# Patient Record
Sex: MALE | Race: WHITE
[De-identification: names, ages, dates, MRNs, and addresses within clinical notes are randomized per-mention and may not be internally consistent; named-entity substitution may affect disease eponyms.]

---

## 2021-12-30 ENCOUNTER — HOSPITAL ENCOUNTER (OUTPATIENT)
Dept: HOSPITAL 83 - US | Age: 56
Discharge: HOME | End: 2021-12-30
Attending: NURSE PRACTITIONER
Payer: OTHER GOVERNMENT

## 2021-12-30 DIAGNOSIS — R22.31: Primary | ICD-10-CM

## 2022-09-23 ENCOUNTER — INITIAL CONSULT (OUTPATIENT)
Dept: NEUROSURGERY | Age: 57
End: 2022-09-23
Payer: OTHER GOVERNMENT

## 2022-09-23 VITALS
SYSTOLIC BLOOD PRESSURE: 149 MMHG | WEIGHT: 240 LBS | DIASTOLIC BLOOD PRESSURE: 90 MMHG | BODY MASS INDEX: 33.6 KG/M2 | HEIGHT: 71 IN | HEART RATE: 88 BPM

## 2022-09-23 DIAGNOSIS — M54.2 NECK PAIN: Primary | ICD-10-CM

## 2022-09-23 PROCEDURE — 99202 OFFICE O/P NEW SF 15 MIN: CPT

## 2022-09-23 PROCEDURE — 99204 OFFICE O/P NEW MOD 45 MIN: CPT | Performed by: NEUROLOGICAL SURGERY

## 2022-09-23 RX ORDER — LEVOTHYROXINE SODIUM 137 UG/1
0.14 TABLET ORAL
COMMUNITY
Start: 2022-03-23

## 2022-09-23 RX ORDER — GABAPENTIN 300 MG/1
300 CAPSULE ORAL
COMMUNITY
Start: 2022-03-23

## 2022-09-23 RX ORDER — ATORVASTATIN CALCIUM 20 MG/1
20 TABLET, FILM COATED ORAL
COMMUNITY
Start: 2022-03-23 | End: 2022-09-23

## 2022-09-23 RX ORDER — LISINOPRIL 10 MG/1
10 TABLET ORAL
COMMUNITY
Start: 2022-03-23

## 2022-09-23 RX ORDER — MELOXICAM 15 MG/1
15 TABLET ORAL
COMMUNITY
Start: 2022-03-23

## 2022-09-23 ASSESSMENT — ENCOUNTER SYMPTOMS
ALLERGIC/IMMUNOLOGIC NEGATIVE: 1
RESPIRATORY NEGATIVE: 1
GASTROINTESTINAL NEGATIVE: 1
EYES NEGATIVE: 1

## 2022-09-23 NOTE — PROGRESS NOTES
Domingo Rodriguez (:  1965) is a 60 y.o. male,New patient, here for evaluation of the following chief complaint(s):  Consultation (Cervical pain radiates down left arm into hands with weakness, numbness and tingling. [email or web address] over a yr ago, No [facility])         ASSESSMENT/PLAN:  1. Neck pain  60year old male who presents with neck pain. He has early signs of myelopathy. His MRI shows moderate to severe stenosis at C3-C4 and C4-C5. I am recommending a C3-C4 and C4-C5 anterior cervical diskectomy and fusion. He will need to quit smoking    No follow-ups on file. Subjective   SUBJECTIVE/OBJECTIVE:  HPI  60year old male who presents with neck and left arm pain. The pain is described as sharp and achy. It is rated as 5/10. He also has numbness and tingling in the left arm. The pain  is worse with activity and better with rest.  He has some loss of dexterity in his hands. He denies loss of control of bowel or bladder function. He has tried NSAIDs, membrane stabilizers and physical therapy. Review of Systems   Constitutional: Negative. HENT: Negative. Eyes: Negative. Respiratory: Negative. Cardiovascular: Negative. Gastrointestinal: Negative. Endocrine: Negative. Genitourinary: Negative. Musculoskeletal: Negative. Skin: Negative. Allergic/Immunologic: Negative. Neurological:  Positive for weakness, numbness and headaches. Hematological: Negative. Psychiatric/Behavioral: Negative. Objective   Physical Exam  Vitals reviewed. Constitutional:       General: He is not in acute distress. Appearance: Normal appearance. He is normal weight. He is not ill-appearing, toxic-appearing or diaphoretic. HENT:      Head: Normocephalic and atraumatic. Eyes:      General: No visual field deficit or scleral icterus. Right eye: No discharge. Left eye: No discharge. Extraocular Movements: Extraocular movements intact. Conjunctiva/sclera: Conjunctivae normal.      Pupils: Pupils are equal, round, and reactive to light. Pulmonary:      Effort: Pulmonary effort is normal. No respiratory distress. Abdominal:      General: Abdomen is flat. There is no distension. Musculoskeletal:         General: No swelling, tenderness, deformity or signs of injury. Normal range of motion. Right lower leg: No edema. Left lower leg: No edema. Skin:     General: Skin is warm and dry. Capillary Refill: Capillary refill takes less than 2 seconds. Coloration: Skin is not jaundiced or pale. Findings: No bruising, erythema, lesion or rash. Neurological:      Mental Status: He is alert and oriented to person, place, and time. Mental status is at baseline. Cranial Nerves: No cranial nerve deficit, dysarthria or facial asymmetry. Sensory: Sensation is intact. No sensory deficit. Motor: Weakness present. No tremor, atrophy, abnormal muscle tone, seizure activity or pronator drift. Coordination: Coordination normal.      Gait: Gait normal.      Deep Tendon Reflexes: Reflexes normal. Babinski sign absent on the right side. Babinski sign absent on the left side. Reflex Scores:       Tricep reflexes are 2+ on the right side and 2+ on the left side. Bicep reflexes are 2+ on the right side and 2+ on the left side. Brachioradialis reflexes are 2+ on the right side and 2+ on the left side. Patellar reflexes are 2+ on the right side and 2+ on the left side. Achilles reflexes are 2+ on the right side and 2+ on the left side. Comments: 4/5 in LUE  Positive Lucia's bilaterally   Psychiatric:         Mood and Affect: Mood normal.         Behavior: Behavior normal.         Thought Content:  Thought content normal.         Judgment: Judgment normal.          On this date 9/23/2022 I have spent 45 minutes reviewing previous notes, test results and face to face with the patient discussing the diagnosis and importance of compliance with the treatment plan as well as documenting on the day of the visit. An electronic signature was used to authenticate this note.     --Chay Da Silva MD

## 2025-04-23 ENCOUNTER — HOSPITAL ENCOUNTER (OUTPATIENT)
Dept: HOSPITAL 83 - CT | Age: 60
Discharge: HOME | End: 2025-04-23
Attending: NURSE PRACTITIONER
Payer: OTHER GOVERNMENT

## 2025-04-23 DIAGNOSIS — M47.816: ICD-10-CM

## 2025-04-23 DIAGNOSIS — R31.29: ICD-10-CM

## 2025-04-23 DIAGNOSIS — K76.0: Primary | ICD-10-CM

## 2025-04-23 DIAGNOSIS — I77.811: ICD-10-CM

## 2025-06-30 ENCOUNTER — HOSPITAL ENCOUNTER (OUTPATIENT)
Dept: HOSPITAL 83 - CT | Age: 60
Discharge: HOME | End: 2025-06-30
Attending: UROLOGY
Payer: OTHER GOVERNMENT

## 2025-06-30 DIAGNOSIS — R31.9: ICD-10-CM

## 2025-06-30 DIAGNOSIS — R53.83: ICD-10-CM

## 2025-06-30 DIAGNOSIS — D40.0: ICD-10-CM

## 2025-06-30 DIAGNOSIS — Z12.5: ICD-10-CM

## 2025-06-30 DIAGNOSIS — K57.30: Primary | ICD-10-CM

## 2025-06-30 DIAGNOSIS — N32.89: ICD-10-CM

## 2025-06-30 DIAGNOSIS — K76.0: ICD-10-CM

## 2025-06-30 DIAGNOSIS — I70.0: ICD-10-CM

## 2025-06-30 LAB
ALP SERPL-CCNC: 70 U/L (ref 46–116)
ALT SERPL W P-5'-P-CCNC: 38 U/L (ref 5–49)
APPEARANCE UR: CLEAR
BACTERIA #/AREA URNS HPF: (no result) /[HPF]
BASOPHILS # BLD AUTO: 0 10*3/UL (ref 0–0.1)
BASOPHILS NFR BLD AUTO: 0.6 % (ref 0–1)
BILIRUB UR QL STRIP: NEGATIVE
BUN SERPL-MCNC: 15 MG/DL (ref 9–23)
CASTS URNS QL MICRO: (no result)
CHLORIDE SERPL-SCNC: 109 MMOL/L (ref 98–107)
COLOR UR: YELLOW
CRYSTALS URNS MICRO: (no result)
EOSINOPHIL # BLD AUTO: 0.2 10*3/UL (ref 0–0.4)
EOSINOPHIL # BLD AUTO: 2.4 % (ref 1–4)
ERYTHROCYTE [DISTWIDTH] IN BLOOD BY AUTOMATED COUNT: 12.4 % (ref 0–14.5)
GLUCOSE UR QL: NEGATIVE
HCT VFR BLD AUTO: 43.4 % (ref 42–52)
HGB UR QL STRIP: NEGATIVE
KETONES UR QL STRIP: NEGATIVE
LEUKOCYTE ESTERASE UR QL STRIP: NEGATIVE
MANUAL DIFFERENTIAL PERFORMED BLD QL: (no result)
MCH RBC QN AUTO: 29.6 PG (ref 27–31)
MCHC RBC AUTO-ENTMCNC: 32 G/DL (ref 33–37)
MCV RBC AUTO: 92.3 FL (ref 80–94)
MONOCYTES # BLD AUTO: 0.6 10*3/UL (ref 0.1–1)
MONOCYTES NFR BLD MANUAL: 9.3 % (ref 3–9)
MUCOUS THREADS URNS QL MICRO: (no result)
NEUT #: 3.7 10*3/UL (ref 2.3–7.9)
NEUTROPHILS NFR BLD AUTO: 59.4 % (ref 47–73)
NITRITE UR QL STRIP: NEGATIVE
NRBC BLD QL AUTO: 0 10*3/UL (ref 0–0)
PH UR STRIP: 6 [PH] (ref 4.5–8)
PLATELET # BLD AUTO: 230 10*3/UL (ref 130–400)
PMV BLD AUTO: 9.5 FL (ref 9.6–12.3)
POTASSIUM SERPL-SCNC: 4.4 MMOL/L (ref 3.4–5.1)
PROT SERPL-MCNC: 6.2 GM/DL (ref 6–8)
RBC # BLD AUTO: 4.7 10*6/UL (ref 4.5–5.9)
SP GR UR: >= 1.03 (ref 1–1.03)
T VAGINALIS URNS QL MICRO: (no result)
UROBILINOGEN UR STRIP-MCNC: 0.2 E.U./DL (ref 0–1)
WBC #/AREA URNS HPF: (no result) WBC/HPF (ref 0–5)
WBC NRBC COR # BLD AUTO: 6.2 10*3/UL (ref 4.8–10.8)
YEAST #/AREA URNS HPF: (no result) /[HPF]